# Patient Record
Sex: MALE | Race: WHITE | Employment: UNEMPLOYED | URBAN - METROPOLITAN AREA
[De-identification: names, ages, dates, MRNs, and addresses within clinical notes are randomized per-mention and may not be internally consistent; named-entity substitution may affect disease eponyms.]

---

## 2020-05-28 ENCOUNTER — HOSPITAL ENCOUNTER (OUTPATIENT)
Age: 50
Setting detail: SPECIMEN
Discharge: HOME | End: 2020-05-28
Payer: MEDICAID

## 2020-05-28 ENCOUNTER — OFFICE VISIT (OUTPATIENT)
Dept: PRIMARY CARE CLINIC | Age: 50
End: 2020-05-28
Payer: MEDICAID

## 2020-05-28 VITALS — HEART RATE: 67 BPM | OXYGEN SATURATION: 98 % | TEMPERATURE: 97.4 F

## 2020-05-28 PROCEDURE — U0002 COVID-19 LAB TEST NON-CDC: HCPCS

## 2020-05-28 PROCEDURE — 99212 OFFICE O/P EST SF 10 MIN: CPT | Performed by: FAMILY MEDICINE

## 2020-05-28 NOTE — PROGRESS NOTES
5/28/20  Rubi Lindquist [facility]  1970     Self Employed    FLU/COVID-19 CLINIC EVALUATION    HPI SYMPTOMS: This  51 yo m here for COVID 19 testing. +Exposure to COVID 19 from a friend. No symptoms    [] Fevers  [] Chills  [] Cough  [] Coughing up blood  [] Chest Congestion  [] Nasal Congestion  [] Feeling short of breath  [] Sometimes  [] Frequently  [] All the time  [] Chest pain  [] Headaches  []Tolerable  [] Severe  [] Sore throat  [] Muscle aches  [] Nausea  [] Vomiting  []Unable to keep fluids down  [] Diarrhea  []Severe    [] OTHER SYMPTOMS:      Symptom Duration:   [] 1  [] 2   [] 3   [] 4    [] 5   [] 6   [] 7   [] 8   [] 9   [] 10   [] 11   [] 12   [] 13   [] 14   [] Longer than 14 days    Symptom course:   [] Worsening     [] Stable     [] Improving    RISK FACTORS:    [] Pregnant or possibly pregnant  [] Age over 61  [] Diabetes  [] Heart disease  [] Asthma  [] COPD/Other chronic lung diseases  [] Active Cancer  [] On Chemotherapy  [] Taking oral steroids  [] History Lymphoma/Leukemia  [x] Close contact with a lab confirmed COVID-19 patient within 14 days of symptom onset  [] History of travel from affected geographical areas within 14 days of symptom onset       VITALS:  Vitals:    05/28/20 0938   Pulse: 67   Temp: 97.4 °F (36.3 °C)   SpO2: 98%   Alert. NAD  Throat clear  CV: RRR  Lungs: CTA B/L     TESTS:    POCT FLU:  [] Positive     []Negative    ASSESSMENT:  Exposure to COVID 19  [] Flu  [] Possible COVID-19  [] Strep    PLAN:  COVID 19 testing  [x] Discharge home with written instructions for:  [] Flu management  [x] Possible COVID-19 infection with self-quarantine and management of symptoms  [x] Follow-up with primary care physician or emergency department if worsens  [] Evaluation per physician/nurse practitioner in clinic  [] Sent to ER       An  electronic signature was used to authenticate this note.      --Evelio Adkins, DO on 5/28/2020 at 9:39 AM

## 2020-05-28 NOTE — PATIENT INSTRUCTIONS
health professionals, as appropriate. When working with your local health department check their available hours. If you have a medical emergency and need to call 911, notify the dispatch personnel that you have, or are being evaluated for COVID-19. If possible, put on a facemask before emergency medical services arrive. Discontinuing home isolation  Patients with confirmed COVID-19 should remain under home isolation precautions until the risk of secondary transmission to others is thought to be low. The decision to discontinue home isolation precautions should be made on a case-by-case basis, in consultation with healthcare providers and state and local health departments.

## 2020-05-29 LAB
SARS-COV-2: NOT DETECTED
SOURCE: NORMAL

## 2021-09-27 ENCOUNTER — HOSPITAL ENCOUNTER (EMERGENCY)
Age: 51
Discharge: HOME | End: 2021-09-27
Attending: EMERGENCY MEDICINE
Payer: COMMERCIAL

## 2021-09-27 ENCOUNTER — APPOINTMENT (OUTPATIENT)
Dept: GENERAL RADIOLOGY | Age: 51
End: 2021-09-27
Payer: COMMERCIAL

## 2021-09-27 VITALS
RESPIRATION RATE: 18 BRPM | SYSTOLIC BLOOD PRESSURE: 137 MMHG | OXYGEN SATURATION: 99 % | TEMPERATURE: 97.8 F | WEIGHT: 218 LBS | HEART RATE: 57 BPM | HEIGHT: 75 IN | DIASTOLIC BLOOD PRESSURE: 83 MMHG | BODY MASS INDEX: 27.1 KG/M2

## 2021-09-27 DIAGNOSIS — T14.8XXA PUNCTURE WOUND: Primary | ICD-10-CM

## 2021-09-27 PROCEDURE — 6370000000 HC RX 637 (ALT 250 FOR IP): Performed by: EMERGENCY MEDICINE

## 2021-09-27 PROCEDURE — 90715 TDAP VACCINE 7 YRS/> IM: CPT | Performed by: EMERGENCY MEDICINE

## 2021-09-27 PROCEDURE — 73130 X-RAY EXAM OF HAND: CPT

## 2021-09-27 PROCEDURE — 99283 EMERGENCY DEPT VISIT LOW MDM: CPT

## 2021-09-27 PROCEDURE — 6360000002 HC RX W HCPCS: Performed by: EMERGENCY MEDICINE

## 2021-09-27 PROCEDURE — 90471 IMMUNIZATION ADMIN: CPT | Performed by: EMERGENCY MEDICINE

## 2021-09-27 RX ORDER — CEFADROXIL 500 MG/1
500 CAPSULE ORAL 2 TIMES DAILY
Qty: 10 CAPSULE | Refills: 0 | Status: SHIPPED | OUTPATIENT
Start: 2021-09-27 | End: 2021-10-02

## 2021-09-27 RX ORDER — CEPHALEXIN 500 MG/1
500 CAPSULE ORAL ONCE
Status: COMPLETED | OUTPATIENT
Start: 2021-09-27 | End: 2021-09-27

## 2021-09-27 RX ADMIN — CEPHALEXIN 500 MG: 500 CAPSULE ORAL at 13:53

## 2021-09-27 RX ADMIN — TETANUS TOXOID, REDUCED DIPHTHERIA TOXOID AND ACELLULAR PERTUSSIS VACCINE, ADSORBED 0.5 ML: 5; 2.5; 8; 8; 2.5 SUSPENSION INTRAMUSCULAR at 13:53

## 2021-09-27 ASSESSMENT — PAIN DESCRIPTION - ORIENTATION
ORIENTATION: LEFT
ORIENTATION: LEFT

## 2021-09-27 ASSESSMENT — PAIN DESCRIPTION - DESCRIPTORS: DESCRIPTORS: ACHING;DISCOMFORT

## 2021-09-27 ASSESSMENT — PAIN DESCRIPTION - PAIN TYPE
TYPE: ACUTE PAIN
TYPE: ACUTE PAIN

## 2021-09-27 ASSESSMENT — PAIN SCALES - GENERAL
PAINLEVEL_OUTOF10: 2
PAINLEVEL_OUTOF10: 2

## 2021-09-27 ASSESSMENT — PAIN DESCRIPTION - FREQUENCY
FREQUENCY: INTERMITTENT
FREQUENCY: CONTINUOUS

## 2021-09-27 ASSESSMENT — PAIN DESCRIPTION - LOCATION
LOCATION: HAND
LOCATION: HAND

## 2021-09-27 NOTE — ED PROVIDER NOTES
Triage Chief Complaint:   Puncture Wound (to lt hand from a drill)    [place]:  Bernardo Woodard is a 46 y.o. male that presents to the ED with a puncture wound to his left hand it happened early this morning. It was a drill he has no sensation of foreign bodies no numbness or tingling. Last tetanus shot is over 10 years. HPI    History reviewed. No pertinent past medical history. Past Surgical History:   Procedure Laterality Date    KNEE SURGERY       History reviewed. No pertinent family history. Social History     Socioeconomic History    Marital status: Unknown     Spouse name: Not on file    Number of children: Not on file    Years of education: Not on file    Highest education level: Not on file   Occupational History    Not on file   Tobacco Use    Smoking status: Never Smoker    Smokeless tobacco: Never Used   Substance and Sexual Activity    Alcohol use: Not Currently    Drug use: Never    Sexual activity: Never   Other Topics Concern    Not on file   Social History Narrative    Not on file     Social Determinants of Health     Financial Resource Strain:     Difficulty of Paying Living Expenses:    Food Insecurity:     Worried About Running Out of Food in the Last Year:     [address] in the Last Year:    Transportation Needs:     Lack of Transportation (Medical):      Lack of Transportation (Non-Medical):    Physical Activity:     Days of Exercise per Week:     Minutes of Exercise per Session:    Stress:     Feeling of Stress :    Social Connections:     Frequency of Communication with Friends and Family:     Frequency of Social Gatherings with Friends and Family:     Attends Anabaptism Services:     Active Member of Clubs or Organizations:     Attends Club or Organization Meetings:     Marital Status:    Intimate Partner Violence:     Fear of Current or Ex-Partner:     Emotionally Abused:     Physically Abused:     Sexually Abused:      Current Facility-Administered Medications   Medication Dose Route Frequency Provider Last Rate Last Admin    cephALEXin (KEFLEX) capsule 500 mg  500 mg Oral Once Terrance Cinnamon, DO        Tetanus-Diphth-Acell Pertussis (BOOSTRIX) injection 0.5 mL  0.5 mL IntraMUSCular Once Terrance Cinnamon, DO         Current Outpatient Medications   Medication Sig Dispense Refill    cefadroxil (DURICEF) 500 MG capsule Take 1 capsule by mouth 2 times daily for 5 days 10 capsule 0     No Known Allergies      ROS:    Review of Systems   Skin: Positive for wound. Neurological: Negative. All other systems reviewed and are negative. Nursing Notes Reviewed    Physical Exam:  ED Triage Vitals [09/27/21 1231]   Enc Vitals Group      /88      Pulse 68      Resp 18      Temp 98.1 °F (36.7 °C)      Temp Source Oral      SpO2 100 %      Weight 218 lb (98.9 kg)      Height 6' 3\" (1.905 m)      Head Circumference       Peak Flow       Pain Score       Pain Loc       Pain Edu? Excl. in [address]? Physical Exam  Vitals and nursing note reviewed. Constitutional:       Appearance: He is well-developed. HENT:      Head: Normocephalic and atraumatic. Eyes:      Pupils: Pupils are equal, round, and reactive to light. Musculoskeletal:         General: Normal range of motion. Cervical back: Normal range of motion and neck supple. Comments: Left hand reveals a new puncture wound to the mid aspect of the hypothenar eminence. There is no devitalized tissue no visible palpable foreign body full active range of motion of all digits isolated testing of his flexor superficialis and profundus are normal.  Intact sensation. Skin:     General: Skin is warm and dry. Neurological:      Mental Status: He is alert and oriented to person, place, and time. I have reviewed and interpreted all of the currently available lab results from this visit (ifapplicable):  No results found for this visit on 09/27/21.    Radiographs (if obtained):  [] The following radiograph wasinterpreted by myself in the absence of a radiologist:   [] Radiologist's Report Reviewed:  XR HAND LEFT (MIN 3 VIEWS)    (Results Pending)         EKG (if obtained): (All EKG's are interpreted by myself in the absence of a cardiologist)    Chart review shows recent radiographs:  No results found. MDM:      Patient sustained a puncture wound to the palmar aspect of his left mid hypothenar eminence. It is bruised there is no devitalized tissue no signs of infection no visible palpable foreign body. He has full active range of motion there is no signs of tenosynovitis he understands there are 2 problems that occur he could have delayed problems with infection potential occult retained foreign body at that could result in surgery amputation he understands this is a benign her wound he will undergo tetanus update prophylactic antibiotics for 5 days mandatory wound check in 24 hours x-ray was obtained and stable. All questions answered    Please note that portions of this note may have been completed with a voice recognition/dictation program. Efforts were made to edit the dictations but occasionally words are mis-transcribed.      All pertinent Lab data and radiographic results reviewed with patient at bedside. The patient and/or the family were informed of the results of any tests/labs/imaging, the treatment plan, and time was allotted to answer questions. See chart for details of medications given during the ED stay.     The likelihood of other entities in the differential is insufficient to justify any further testing for them. This was explained to the patient. The patient was advised that persistent or worsening symptoms would require further evaluation.                Clinical Impression:  1.  Puncture wound      Disposition referral (if applicable):  [facility] Emergency Department  [address] [place] [id number] [phone]  Schedule an appointment as soon as possible for a visit in 2 days  If symptoms worsen    Disposition medications (if applicable):  New Prescriptions    CEFADROXIL (DURICEF) 500 MG CAPSULE    Take 1 capsule by mouth 2 times daily for 5 days           Adan Brown DO, FACEP      Comment: Please note this report has been produced using speech recognition software and maycontain errors related to that system including errors in grammar, punctuation, and spelling, as well as words and phrases that may be inappropriate. If there are any questions or concerns please feel free to contact thedictating provider for clarification.         Prabha Sterling DO  09/27/21 2914

## 2024-10-18 ENCOUNTER — APPOINTMENT (OUTPATIENT)
Dept: GENERAL RADIOLOGY | Age: 54
End: 2024-10-18
Payer: MEDICAID

## 2024-10-18 ENCOUNTER — HOSPITAL ENCOUNTER (EMERGENCY)
Age: 54
Discharge: HOME | End: 2024-10-18
Attending: EMERGENCY MEDICINE
Payer: MEDICAID

## 2024-10-18 VITALS
HEIGHT: 75 IN | SYSTOLIC BLOOD PRESSURE: 116 MMHG | WEIGHT: 218 LBS | OXYGEN SATURATION: 98 % | BODY MASS INDEX: 27.1 KG/M2 | DIASTOLIC BLOOD PRESSURE: 69 MMHG | TEMPERATURE: 98.3 F | HEART RATE: 87 BPM | RESPIRATION RATE: 18 BRPM

## 2024-10-18 DIAGNOSIS — M23.42 LOOSE BODY IN KNEE, LEFT KNEE: ICD-10-CM

## 2024-10-18 DIAGNOSIS — M17.11 ARTHRITIS OF RIGHT KNEE: ICD-10-CM

## 2024-10-18 DIAGNOSIS — M25.561 ACUTE PAIN OF RIGHT KNEE: ICD-10-CM

## 2024-10-18 DIAGNOSIS — M25.462 EFFUSION OF LEFT KNEE JOINT: Primary | ICD-10-CM

## 2024-10-18 DIAGNOSIS — M17.12 ARTHRITIS OF LEFT KNEE: ICD-10-CM

## 2024-10-18 PROCEDURE — 73562 X-RAY EXAM OF KNEE 3: CPT

## 2024-10-18 PROCEDURE — 99283 EMERGENCY DEPT VISIT LOW MDM: CPT

## 2024-10-18 RX ORDER — PREDNISONE 50 MG/1
50 TABLET ORAL DAILY
Qty: 5 TABLET | Refills: 0 | Status: SHIPPED | OUTPATIENT
Start: 2024-10-18 | End: 2024-10-23

## 2024-10-18 RX ORDER — LIDOCAINE 50 MG/G
1 PATCH TOPICAL DAILY
Qty: 10 PATCH | Refills: 0 | Status: SHIPPED | OUTPATIENT
Start: 2024-10-18 | End: 2024-10-19

## 2024-10-18 RX ORDER — METHOCARBAMOL 500 MG/1
500 TABLET, FILM COATED ORAL 4 TIMES DAILY
Qty: 40 TABLET | Refills: 0 | Status: SHIPPED | OUTPATIENT
Start: 2024-10-18 | End: 2024-10-28

## 2024-10-18 ASSESSMENT — PAIN - FUNCTIONAL ASSESSMENT: PAIN_FUNCTIONAL_ASSESSMENT: 0-10

## 2024-10-18 ASSESSMENT — PAIN DESCRIPTION - LOCATION: LOCATION: KNEE

## 2024-10-18 ASSESSMENT — PAIN SCALES - GENERAL: PAINLEVEL_OUTOF10: 6

## 2024-10-18 ASSESSMENT — PAIN DESCRIPTION - ORIENTATION: ORIENTATION: RIGHT;LEFT

## 2024-10-18 NOTE — DISCHARGE INSTRUCTIONS
Follow-up with any primary care physician on Tuesday as scheduled for reevaluation.  Keep scheduled appointment  Follow-up with orthopedist Dr. Thompson call on Monday for an appointment.  Apply Lidoderm pain patch to affected area twice a day  Prednisone as prescribed for inflammation pain and swelling  Take Robaxin muscle relaxant as prescribed and directed.  No drinking or driving while taking  Return to the emergency department immediately pain fever chills nausea vomiting dizzy lightheadedness or any worsening symptoms.

## 2024-10-18 NOTE — ED PROVIDER NOTES
Emergency Department Encounter    Patient: Gary Molina  MRN: [id number]  : 1970  Date of Evaluation: 10/18/2024  ED Provider:  Capri Hamilton DO    Triage Chief Complaint:   Knee Pain (Bilat knee pain x13 days )    [place]:  Gary Molina is a 54 y.o. male no chronic medical illnesses per patient that presents to the emergency department complaining of bilateral knee pain.  Patient states a couple months ago he jumped down off his feet mixer and injured his both knees.  He states he reinjured them again about 13 days ago by jumping out of the  again.  He states the mixer was set too high and he forgot to lower it before jumping out.  He states he is injured his knees having worsening knee pain.  He states the left knee worse than the right knee.  He states he has been taking some meloxicam from a friend with no relief.  Patient states he has an appointment with his orthopedist 2024.  States has appoint with his primary care physician this coming Monday in 3 days as well.  Patient states he has had a ACL and meniscus pair on the left knee.  He states he also has had a meniscus repair on the right knee.  Patient states left knee is just increased pain swelling he has a knee brace on it at this time.  Patient states he has not taken anything for pain today.  Patient here for evaluation.    ROS - see HPI, below listed is current ROS at time of my eval:  10 systems reviewed and negative except as above.     History reviewed. No pertinent past medical history.  Past Surgical History:   Procedure Laterality Date    KNEE SURGERY       History reviewed. No pertinent family history.  Social History     Socioeconomic History    Marital status: Unknown     Spouse name: Not on file    Number of children: Not on file    Years of education: Not on file    Highest education level: Not on file   Occupational History    Not on file   Tobacco Use    Smoking status: Never

## 2024-10-19 RX ORDER — LIDOCAINE 50 MG/G
1 PATCH TOPICAL DAILY
Qty: 10 PATCH | Refills: 0 | Status: SHIPPED | OUTPATIENT
Start: 2024-10-19 | End: 2024-10-29